# Patient Record
Sex: FEMALE | Race: WHITE | Employment: FULL TIME | URBAN - METROPOLITAN AREA
[De-identification: names, ages, dates, MRNs, and addresses within clinical notes are randomized per-mention and may not be internally consistent; named-entity substitution may affect disease eponyms.]

---

## 2024-11-14 ENCOUNTER — OFFICE VISIT (OUTPATIENT)
Age: 49
End: 2024-11-14

## 2024-11-14 VITALS
RESPIRATION RATE: 16 BRPM | TEMPERATURE: 98.9 F | SYSTOLIC BLOOD PRESSURE: 133 MMHG | OXYGEN SATURATION: 98 % | WEIGHT: 180 LBS | DIASTOLIC BLOOD PRESSURE: 86 MMHG | HEART RATE: 68 BPM

## 2024-11-14 DIAGNOSIS — Z48.02 VISIT FOR SUTURE REMOVAL: Primary | ICD-10-CM

## 2024-11-14 PROBLEM — N60.99 ATYPICAL DUCTAL HYPERPLASIA OF BREAST: Status: ACTIVE | Noted: 2022-08-01

## 2024-11-14 RX ORDER — SUMATRIPTAN 50 MG/1
50 TABLET, FILM COATED ORAL
COMMUNITY

## 2024-11-14 RX ORDER — ESTRADIOL 2 MG/1
2 TABLET ORAL DAILY
COMMUNITY

## 2024-11-14 RX ORDER — ONDANSETRON 4 MG/1
4 TABLET, ORALLY DISINTEGRATING ORAL EVERY 8 HOURS PRN
COMMUNITY

## 2024-11-14 RX ORDER — ALPRAZOLAM 0.5 MG
1 TABLET ORAL PRN
COMMUNITY

## 2024-11-14 RX ORDER — CHOLECALCIFEROL (VITAMIN D3) 10 MCG (400 UNIT) TABLET
400 DAILY
COMMUNITY

## 2024-11-14 NOTE — PATIENT INSTRUCTIONS
Follow surgeon's recommendation for wound care.  Strongly recommend post-op follow up at scheduled time.

## 2024-11-14 NOTE — PROGRESS NOTES
Patient Name: Sharla Gaming   YOB: 1975   Patient Status: New patient,   Chief Complaint: Suture / Staple Removal (Pt present for stitch removal in the abdominal area)      ____________________________________________________________________________________________    External Records Reviewed: None    Limitation to History: None    Outside Historian: None    SUBJECTIVE/OBJECTIVE:  Sharla Gaming is a 49 y.o. female presents with request for post-op suture removal status post tummy tuck and breast augmentation.  She reports it has been 2 weeks since surgery and she is in town recovering with her fiance but she lives in [place] where she had surgery and does not want to have to go home early to have sutures removed.  She reports she was told it was ok to remove the black sutures with clear sutures being absorbable. She denies fever or any acute complaints.       PAST MEDICAL HISTORY:   Medical: Pt  has no past medical history on file.  Surgical: Pt  has no past surgical history on file.  Family: Pt family history is not on file.  Social: Pt   Social History     Socioeconomic History    Marital status: Single     Spouse name: Not on file    Number of children: Not on file    Years of education: Not on file    Highest education level: Not on file   Occupational History    Not on file   Tobacco Use    Smoking status: Never    Smokeless tobacco: Not on file   Vaping Use    Vaping status: Never Used   Substance and Sexual Activity    Alcohol use: Not Currently    Drug use: Never    Sexual activity: Not on file   Other Topics Concern    Not on file   Social History Narrative    Not on file     Social Determinants of Health     Financial Resource Strain: Not on file   Food Insecurity: Low Risk  (8/20/2024)    Received from [facility]    Hunger Vital Sign     Worried About Running Out of Food in the Last Year: Never true     Ran Out of Food in the Last Year: Never true   Transportation Needs: No